# Patient Record
Sex: MALE | Race: WHITE | Employment: STUDENT | URBAN - METROPOLITAN AREA
[De-identification: names, ages, dates, MRNs, and addresses within clinical notes are randomized per-mention and may not be internally consistent; named-entity substitution may affect disease eponyms.]

---
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
## 2025-07-24 ENCOUNTER — HOSPITAL ENCOUNTER (EMERGENCY)
Facility: CLINIC | Age: 20
Discharge: HOME | End: 2025-07-24
Attending: EMERGENCY MEDICINE

## 2025-07-24 VITALS
TEMPERATURE: 98.8 F | OXYGEN SATURATION: 98 % | HEIGHT: 68 IN | RESPIRATION RATE: 14 BRPM | BODY MASS INDEX: 22.73 KG/M2 | DIASTOLIC BLOOD PRESSURE: 82 MMHG | WEIGHT: 150 LBS | HEART RATE: 72 BPM | SYSTOLIC BLOOD PRESSURE: 130 MMHG

## 2025-07-24 DIAGNOSIS — S51.852A CAT BITE OF LEFT FOREARM, INITIAL ENCOUNTER: Primary | ICD-10-CM
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
## 2025-07-24 PROCEDURE — 6360000002 HC RX W HCPCS

## 2025-07-24 PROCEDURE — 6370000000 HC RX 637 (ALT 250 FOR IP)

## 2025-07-24 PROCEDURE — 90715 TDAP VACCINE 7 YRS/> IM: CPT

## 2025-07-24 PROCEDURE — 90471 IMMUNIZATION ADMIN: CPT

## 2025-07-24 PROCEDURE — 99284 EMERGENCY DEPT VISIT MOD MDM: CPT

## 2025-07-24 RX ADMIN — TETANUS TOXOID, REDUCED DIPHTHERIA TOXOID AND ACELLULAR PERTUSSIS VACCINE, ADSORBED 0.5 ML: 5; 2.5; 8; 8; 2.5 SUSPENSION INTRAMUSCULAR at 15:28

## 2025-07-24 RX ADMIN — AMOXICILLIN AND CLAVULANATE POTASSIUM 1 TABLET: 875; 125 TABLET, FILM COATED ORAL at 15:28

## 2025-07-24 ASSESSMENT — PAIN DESCRIPTION - DESCRIPTORS: DESCRIPTORS: THROBBING

## 2025-07-24 ASSESSMENT — PAIN - FUNCTIONAL ASSESSMENT
PAIN_FUNCTIONAL_ASSESSMENT: 0-10
PAIN_FUNCTIONAL_ASSESSMENT: 0-10

## 2025-07-24 ASSESSMENT — LIFESTYLE VARIABLES
HOW OFTEN DO YOU HAVE A DRINK CONTAINING ALCOHOL: MONTHLY OR LESS
HOW MANY STANDARD DRINKS CONTAINING ALCOHOL DO YOU HAVE ON A TYPICAL DAY: 3 OR 4

## 2025-07-24 ASSESSMENT — PAIN DESCRIPTION - LOCATION: LOCATION: ARM

## 2025-07-24 ASSESSMENT — PAIN DESCRIPTION - ORIENTATION: ORIENTATION: LEFT

## 2025-07-24 ASSESSMENT — PAIN SCALES - GENERAL
PAINLEVEL_OUTOF10: 2
PAINLEVEL_OUTOF10: 2

## 2025-07-24 NOTE — DISCHARGE INSTRUCTIONS
Care Instructions After a Cat Bite  Thank you for coming in for care. Cat bites can cause deep puncture wounds that are at higher risk for infection. Please follow these instructions to help your wound heal and to watch for signs of infection.  Wound Care   Keep the wound clean and dry. Wash the area gently with soap and water at least once a day.   You may cover the wound with a clean, non-stick bandage. Change the bandage daily or if it becomes wet or dirty.   Do not use ointments or creams unless told to do so by your healthcare provider.   Avoid closing the wound with tape or glue unless instructed, as this can trap bacteria inside.[1-2][7]  Antibiotics   Cat bites are more likely to get infected than dog bites. Most people with a cat bite will be prescribed an antibiotic to help prevent infection.   The most common antibiotic is amoxicillin-clavulanate, usually taken for 3-5 days to prevent infection, or 10-14 days if the wound is already infected. Take all medication as prescribed, even if the wound looks better.[3][5-7]   If you are allergic to penicillin, your provider may prescribe a different antibiotic, such as doxycycline or a combination of clindamycin and trimethoprim-sulfamethoxazole.[5][7]  Tetanus and Rabies   Make sure your tetanus shot is up to date. If you have not had a tetanus shot in the last 5 years, you may need a booster.[1-4][6-7]   The risk of rabies from a cat bite is low, but if the cat was acting strangely, is unknown to you, or cannot be observed for 10 days, rabies treatment may be needed. Your healthcare provider will discuss this with you if necessary.[1-3][6-7]  What to Watch For  Call your healthcare provider or return to the emergency department if you notice any of the following:   Redness, swelling, or warmth spreading from the bite   Pus or drainage from the wound   Fever or chills   Increasing pain   Trouble moving the affected area   Red streaks going up your arm or

## 2025-07-24 NOTE — ED PROVIDER NOTES
[facility]  eMERGENCY dEPARTMENT eNCOUnter   Independent Attestation     Pt Name: Sam Zhang  MRN: [id number]  Birthdate 2005  Date of evaluation: 7/24/25       Sam Zhang is a 20 y.o. male who presents with Animal Bite        Based on the medical record, the care appears appropriate. I was personally available for consultation in the Emergency Department.    Quan Villanueva DO  Attending Emergency  Physician                Quan Villanueva DO  07/24/25 1525    
care and discharge at this time.    The patient understands that at this time there is no evidence for a more malignant underlying process, but also understands that early in the process of an illness or injury, an emergency department work-up can be falsely reassuring.  Routine discharge counseling was given, and the patient understands that worsening, changing or persistent symptoms should prompt a immediate call or follow-up with their primary care physician or return to the emergency department.  The importance of appropriate follow-up was also discussed.  I have reviewed the disposition diagnosis with the patient.  I have answered their questions and given discharge instructions.  They voiced understanding of these instructions and did not have any further questions or complaints. They were updated on all incidental findings.            CRITICAL CARE TIME   None    CONSULTS:  None    PROCEDURES:  Unless otherwise noted below, none     Procedures        FINAL IMPRESSION      1. Cat bite of left forearm, initial encounter          DISPOSITION/PLAN       DISPOSITION Decision To Discharge 07/24/2025 03:18:52 PM   DISPOSITION CONDITION Stable           PATIENT REFERRED TO:  419-same-day  Call and schedule an appointment if you are not currently established with primary care provider.  Call in 1 day  To get established with primary care physician    [place] [id number]  [phone]  Go to   As needed, If symptoms worsen, For wound re-check      DISCHARGE MEDICATIONS:  Current Discharge Medication List        START taking these medications    Details   amoxicillin-clavulanate (AUGMENTIN) 875-125 MG per tablet Take 1 tablet by mouth 2 times daily for 7 days  Qty: 14 tablet, Refills: 0           Controlled Substances Monitoring:          No data to display                (Please note that portions of this note were completed with a voice recognition program.  Efforts were made

## 2025-09-05 ENCOUNTER — HOSPITAL ENCOUNTER (EMERGENCY)
Facility: CLINIC | Age: 20
Discharge: HOME | End: 2025-09-05
Attending: EMERGENCY MEDICINE

## 2025-09-05 VITALS
RESPIRATION RATE: 16 BRPM | DIASTOLIC BLOOD PRESSURE: 90 MMHG | BODY MASS INDEX: 20.46 KG/M2 | TEMPERATURE: 98.3 F | SYSTOLIC BLOOD PRESSURE: 142 MMHG | HEIGHT: 68 IN | HEART RATE: 93 BPM | WEIGHT: 135 LBS | OXYGEN SATURATION: 99 %

## 2025-09-05 DIAGNOSIS — H18.892 CORNEAL RUST RING OF LEFT EYE: Primary | ICD-10-CM

## 2025-09-05 DIAGNOSIS — T15.92XA FOREIGN BODY OF LEFT EYE, INITIAL ENCOUNTER: ICD-10-CM

## 2025-09-05 PROCEDURE — 6370000000 HC RX 637 (ALT 250 FOR IP): Performed by: EMERGENCY MEDICINE

## 2025-09-05 RX ORDER — ERYTHROMYCIN 5 MG/G
OINTMENT OPHTHALMIC ONCE
Status: COMPLETED | OUTPATIENT
Start: 2025-09-05 | End: 2025-09-05

## 2025-09-05 RX ADMIN — ERYTHROMYCIN: 5 OINTMENT OPHTHALMIC at 12:12

## 2025-09-05 ASSESSMENT — ENCOUNTER SYMPTOMS
EYE PAIN: 1
EYE DISCHARGE: 1

## 2025-09-05 ASSESSMENT — PAIN SCALES - GENERAL: PAINLEVEL_OUTOF10: 1

## 2025-09-05 ASSESSMENT — PAIN - FUNCTIONAL ASSESSMENT: PAIN_FUNCTIONAL_ASSESSMENT: 0-10
